# Patient Record
Sex: FEMALE | Race: WHITE | NOT HISPANIC OR LATINO
[De-identification: names, ages, dates, MRNs, and addresses within clinical notes are randomized per-mention and may not be internally consistent; named-entity substitution may affect disease eponyms.]

---

## 2022-11-06 ENCOUNTER — FORM ENCOUNTER (OUTPATIENT)
Age: 87
End: 2022-11-06

## 2022-11-10 ENCOUNTER — APPOINTMENT (OUTPATIENT)
Dept: CARE COORDINATION | Facility: HOME HEALTH | Age: 87
End: 2022-11-10

## 2022-11-10 DIAGNOSIS — Z87.01 PERSONAL HISTORY OF PNEUMONIA (RECURRENT): ICD-10-CM

## 2022-11-10 DIAGNOSIS — Z78.9 OTHER SPECIFIED HEALTH STATUS: ICD-10-CM

## 2022-11-10 DIAGNOSIS — A41.9 SEPSIS, UNSPECIFIED ORGANISM: ICD-10-CM

## 2022-11-10 DIAGNOSIS — Z87.19 PERSONAL HISTORY OF OTHER DISEASES OF THE DIGESTIVE SYSTEM: ICD-10-CM

## 2022-11-10 DIAGNOSIS — Z86.2 PERSONAL HISTORY OF DISEASES OF THE BLOOD AND BLOOD-FORMING ORGANS AND CERTAIN DISORDERS INVOLVING THE IMMUNE MECHANISM: ICD-10-CM

## 2022-11-10 DIAGNOSIS — U07.1 COVID-19: ICD-10-CM

## 2022-11-10 DIAGNOSIS — Z86.79 PERSONAL HISTORY OF OTHER DISEASES OF THE CIRCULATORY SYSTEM: ICD-10-CM

## 2022-11-10 DIAGNOSIS — J18.9 PNEUMONIA, UNSPECIFIED ORGANISM: ICD-10-CM

## 2022-11-10 PROCEDURE — 99348 HOME/RES VST EST LOW MDM 30: CPT | Mod: 95

## 2022-11-15 PROBLEM — U07.1 COVID-19: Status: RESOLVED | Noted: 2022-11-15 | Resolved: 2022-11-15

## 2022-11-15 PROBLEM — Z87.01 HISTORY OF PNEUMONIA: Status: RESOLVED | Noted: 2022-11-15 | Resolved: 2022-11-15

## 2022-11-15 PROBLEM — Z86.2 HISTORY OF ANEMIA: Status: RESOLVED | Noted: 2022-11-15 | Resolved: 2022-11-15

## 2022-11-15 PROBLEM — J18.9 PNA (PNEUMONIA): Status: ACTIVE | Noted: 2022-11-15

## 2022-11-15 PROBLEM — Z78.9 DOES NOT USE ILLICIT DRUGS: Status: ACTIVE | Noted: 2022-11-15

## 2022-11-15 PROBLEM — Z78.9 CURRENT NON-SMOKER: Status: ACTIVE | Noted: 2022-11-15

## 2022-11-15 PROBLEM — A41.9 SEPSIS: Status: ACTIVE | Noted: 2022-11-15

## 2022-11-15 PROBLEM — Z00.00 ENCOUNTER FOR PREVENTIVE HEALTH EXAMINATION: Status: ACTIVE | Noted: 2022-11-15

## 2022-11-15 PROBLEM — Z78.9 DENIES ALCOHOL CONSUMPTION: Status: ACTIVE | Noted: 2022-11-15

## 2022-11-15 PROBLEM — Z87.19 HISTORY OF DYSPHAGIA: Status: RESOLVED | Noted: 2022-11-15 | Resolved: 2022-11-15

## 2022-11-15 PROBLEM — Z86.79 HISTORY OF HYPERTENSION: Status: RESOLVED | Noted: 2022-11-15 | Resolved: 2022-11-15

## 2022-11-15 RX ORDER — ELECTROLYTES/DEXTROSE
SOLUTION, ORAL ORAL
Refills: 0 | Status: ACTIVE | COMMUNITY

## 2022-11-15 RX ORDER — SENNOSIDES 8.6 MG/1
CAPSULE, GELATIN COATED ORAL
Refills: 0 | Status: ACTIVE | COMMUNITY

## 2022-11-15 RX ORDER — QUETIAPINE FUMARATE 50 MG/1
50 TABLET ORAL
Refills: 0 | Status: ACTIVE | COMMUNITY

## 2022-11-15 RX ORDER — ATENOLOL 25 MG/1
25 TABLET ORAL
Refills: 0 | Status: ACTIVE | COMMUNITY

## 2022-11-15 RX ORDER — PHENOL 1.4 %
AEROSOL, SPRAY (ML) MUCOUS MEMBRANE
Refills: 0 | Status: ACTIVE | COMMUNITY

## 2022-11-15 RX ORDER — ESCITALOPRAM OXALATE 10 MG/1
10 TABLET, FILM COATED ORAL
Refills: 0 | Status: ACTIVE | COMMUNITY

## 2022-11-15 NOTE — HISTORY OF PRESENT ILLNESS
[Home] : at home, [unfilled] , at the time of the visit. [Other Location: e.g. Home (Enter Location, City,State)___] : at [unfilled] [Other:____] : [unfilled] [Verbal consent obtained from patient] : the patient, [unfilled] [FreeTextEntry1] : Follow up post discharge\par  [de-identified] : This patient is Enrolled in the [facility] Follow Up Program through [employer]\par Copied As per [facility] Discharge Summary\par \par "88-year-old female with hypertension, dementia, anxiety disorder, who presented from SNF with a 2 day hx of worsening shortness of breath, cough and fever. She was diagnosed with COVID over 12 days prior and was treated in the nursing home. Course of treatment was not clear. On admit she met sepsis criteria and was hypoxemic. Chest CT reported bronchiectatic change in left lower lobe with peribronchial thickening. Milder bronchiectatic change at medial right lower lobe. Large amount of retained or refluxed ingested material in lumen of dilated esophagus. Patient was treated with antibiotics for aspiration pneumonia. She was subsequently seen by infectious disease who recommended treating for COVID as well with remdesivir and dexamethasone. pt 's condition has improved but she remains hypoxemic on RA., sat 87%. on 2 l, sat is 93%. pt requires home oxygen. video swaloow showed silent aspiration with thin liquis.Multiple electrolyte abnormalities were corrected. She was found to be vitamin D deficient, which is being repleted. She has anemia related to chronic disease and acute infection. hb is stable.pt's son prefers to take her home. she is stable for discharge" The patient was discharged home in stable condition with home care services and follow up care.\par \par During the TeleHealth the patient was in no acute distress.  CN reviewed that pt  is under the [facility] program for home care until pt is seen by  PCP.   CN will be assigned to sign Home Care orders post-discharge.\par \par Past Surgical and Past Family History unable to assess

## 2022-11-15 NOTE — ASSESSMENT
[FreeTextEntry1] : "88-year-old female with hypertension, dementia, anxiety disorder, who presented from SNF with a 2 day hx of worsening shortness of breath, cough and fever. She was diagnosed with COVID over 12 days prior and was treated in the nursing home. Course of treatment was not clear. On admit she met sepsis criteria and was hypoxemic. Chest CT reported bronchiectatic change in left lower lobe with peribronchial thickening. Milder bronchiectatic change at medial right lower lobe. Large amount of retained or refluxed ingested material in lumen of dilated esophagus. Patient was treated with antibiotics for aspiration pneumonia. She was subsequently seen by infectious disease who recommended treating for COVID as well with remdesivir and dexamethasone. pt 's condition has improved but she remains hypoxemic on RA., sat 87%. on 2 l, sat is 93%. pt requires home oxygen. video swaloow showed silent aspiration with thin liquis.Multiple electrolyte abnormalities were corrected. She was found to be vitamin D deficient, which is being repleted. She has anemia related to chronic disease and acute infection. hb is stable.pt's son prefers to take her home. she is stable for discharge" The patient was discharged home in stable condition with home care services and follow up care.\par \par \par PNA, S/P COVID: stable\par Continue established treatment plan with follow up\par Continue current regimen\par Monitor for worsening signs and symptoms and reviewed when to call medical provider\par Pt's RN verbalized good understanding\par Follow up with Medical providers: PCP\par \par \par COVID: stable\par Continue current medication regimen and oxygen\par Montior for worsening signs and symptoms \par Reviewed when to call medical provider\par Maintain COVID precautions\par Follow up with PCP \par \par \par HTN: stable\par Continue established treatment plan with follow up\par Continue BP meds Metoprolol, ASA\par Monitor for worsening signs and symptoms and reviewed when to call medical provider\par Pt's RN verbalized good understanding\par Follow up with PCP\par \par Anxiety, Dementia: stable\par Continue established treatment plan with follow up\par Continue Quetiapine, Lexapro, Melatonin\par Monitor for worsening signs and symptoms and reviewed when to call medical provider\par Pt's RN verbalized good understanding\par Follow up with Medical providers: PCP\par \par Open wound: stable\par Continue established treatment plan with follow up\par Continue current local skin care\par Monitor for worsening signs and symptoms and reviewed when to call medical provider\par Pt's RN verbalized good understanding\par Follow up with Medical providers: PCP\par \par Pt will need continued Home Care Services RN\par This patient is Enrolled in the [facility] Follow Up Program through [facility] [employer]\par Recommended  referral to assist with PCP follow up appointment\par CN reviewed with the pt and RN that pt is under the [facility] program for home care until pt is seen by the PCP. CN will be assigned to sign Home Care orders post-discharge.\par \par \par Pt will need continued Home Care Services RN\par This patient is Enrolled in the [facility] Follow Up Program through [facility] [employer]\par Recommended  referral to assist with PCP follow up appointment\par CN reviewed with the pt that pt is under the [facility] program for home care until pt is seen by the PCP. CN will be assigned to sign Home Care orders post-discharge.\par

## 2022-11-15 NOTE — PHYSICAL EXAM
[No Acute Distress] : no acute distress [Well Nourished] : well nourished [Normal Sclera/Conjunctiva] : normal sclera/conjunctiva [Normal Outer Ear/Nose] : the outer ears and nose were normal in appearance [No JVD] : no jugular venous distention [No Respiratory Distress] : no respiratory distress  [No Accessory Muscle Use] : no accessory muscle use [de-identified] : Limited Visual Physical Exam during TeleHealth Visit [de-identified] : Awake and alert [de-identified] : Calm

## 2022-11-16 ENCOUNTER — FORM ENCOUNTER (OUTPATIENT)
Age: 87
End: 2022-11-16

## 2022-11-17 ENCOUNTER — FORM ENCOUNTER (OUTPATIENT)
Age: 87
End: 2022-11-17

## 2022-11-21 DIAGNOSIS — T14.8XXA OTHER INJURY OF UNSPECIFIED BODY REGION, INITIAL ENCOUNTER: ICD-10-CM

## 2022-11-21 RX ORDER — SILVER SULFADIAZINE 10 MG/G
1 CREAM TOPICAL TWICE DAILY
Qty: 1 | Refills: 0 | Status: ACTIVE | COMMUNITY
Start: 2022-11-21 | End: 1900-01-01

## 2022-11-28 ENCOUNTER — FORM ENCOUNTER (OUTPATIENT)
Age: 87
End: 2022-11-28

## 2022-11-29 ENCOUNTER — NON-APPOINTMENT (OUTPATIENT)
Age: 87
End: 2022-11-29

## 2022-11-29 DIAGNOSIS — F41.9 ANXIETY DISORDER, UNSPECIFIED: ICD-10-CM

## 2022-11-29 RX ORDER — QUETIAPINE FUMARATE 50 MG/1
50 TABLET ORAL
Qty: 14 | Refills: 0 | Status: ACTIVE | COMMUNITY
Start: 2022-11-29 | End: 1900-01-01